# Patient Record
Sex: FEMALE | Race: WHITE | URBAN - METROPOLITAN AREA
[De-identification: names, ages, dates, MRNs, and addresses within clinical notes are randomized per-mention and may not be internally consistent; named-entity substitution may affect disease eponyms.]

---

## 2022-06-03 ENCOUNTER — NEW PATIENT (OUTPATIENT)
Dept: URBAN - METROPOLITAN AREA CLINIC 52 | Facility: CLINIC | Age: 57
End: 2022-06-03

## 2022-06-03 DIAGNOSIS — H53.59: ICD-10-CM

## 2022-06-03 DIAGNOSIS — H25.13: ICD-10-CM

## 2022-06-03 PROCEDURE — 92015 DETERMINE REFRACTIVE STATE: CPT

## 2022-06-03 PROCEDURE — 92004 COMPRE OPH EXAM NEW PT 1/>: CPT

## 2022-06-03 ASSESSMENT — TONOMETRY
OD_IOP_MMHG: 18
OS_IOP_MMHG: 16

## 2022-06-03 ASSESSMENT — VISUAL ACUITY
OD_CC: 20/20
OD_GLARE: 20/20
OS_GLARE: 20/20
OU_CC: J1
OS_CC: 20/20 PUSH

## 2022-06-03 NOTE — PATIENT DISCUSSION
Spec Rx given today. Discussed with patient her prescription did not change with wearing. 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 Explained would need a pair of glasses just for computer and near work.